# Patient Record
Sex: MALE | Race: BLACK OR AFRICAN AMERICAN | NOT HISPANIC OR LATINO | URBAN - METROPOLITAN AREA
[De-identification: names, ages, dates, MRNs, and addresses within clinical notes are randomized per-mention and may not be internally consistent; named-entity substitution may affect disease eponyms.]

---

## 2017-01-29 ENCOUNTER — EMERGENCY (EMERGENCY)
Facility: HOSPITAL | Age: 22
LOS: 0 days | Discharge: HOME | End: 2017-01-29

## 2017-06-27 DIAGNOSIS — S09.90XA UNSPECIFIED INJURY OF HEAD, INITIAL ENCOUNTER: ICD-10-CM

## 2017-06-27 DIAGNOSIS — M54.2 CERVICALGIA: ICD-10-CM
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
## 2017-06-27 DIAGNOSIS — S40.011A CONTUSION OF RIGHT SHOULDER, INITIAL ENCOUNTER: ICD-10-CM

## 2017-06-27 DIAGNOSIS — S13.4XXA SPRAIN OF LIGAMENTS OF CERVICAL SPINE, INITIAL ENCOUNTER: ICD-10-CM
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
## 2017-10-17 ENCOUNTER — OUTPATIENT (OUTPATIENT)
Dept: OUTPATIENT SERVICES | Facility: HOSPITAL | Age: 22
LOS: 1 days | Discharge: HOME | End: 2017-10-17

## 2017-10-17 DIAGNOSIS — Z01.818 ENCOUNTER FOR OTHER PREPROCEDURAL EXAMINATION: ICD-10-CM

## 2017-10-17 DIAGNOSIS — T84.69XA INFECTION AND INFLAMMATORY REACTION DUE TO INTERNAL FIXATION DEVICE OF OTHER SITE, INITIAL ENCOUNTER: ICD-10-CM

## 2017-10-23 ENCOUNTER — OUTPATIENT (OUTPATIENT)
Dept: OUTPATIENT SERVICES | Facility: HOSPITAL | Age: 22
LOS: 1 days | Discharge: HOME | End: 2017-10-23
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
## 2017-10-27 DIAGNOSIS — T84.89XA OTHER SPECIFIED COMPLICATION OF INTERNAL ORTHOPEDIC PROSTHETIC DEVICES, IMPLANTS AND GRAFTS, INITIAL ENCOUNTER: ICD-10-CM

## 2017-10-27 DIAGNOSIS — Z91.013 ALLERGY TO SEAFOOD: ICD-10-CM

## 2017-10-27 DIAGNOSIS — Z47.2 ENCOUNTER FOR REMOVAL OF INTERNAL FIXATION DEVICE: ICD-10-CM
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
## 2018-05-02 ENCOUNTER — EMERGENCY (EMERGENCY)
Facility: HOSPITAL | Age: 23
LOS: 0 days | Discharge: HOME | End: 2018-05-02
Admitting: EMERGENCY MEDICINE

## 2018-05-02 VITALS
TEMPERATURE: 98 F | SYSTOLIC BLOOD PRESSURE: 141 MMHG | OXYGEN SATURATION: 100 % | RESPIRATION RATE: 20 BRPM | DIASTOLIC BLOOD PRESSURE: 80 MMHG | HEART RATE: 50 BPM

## 2018-05-02 DIAGNOSIS — R52 PAIN, UNSPECIFIED: ICD-10-CM

## 2018-05-02 NOTE — ED ADULT NURSE NOTE - NS ED NURSE DISCH DISPOSITION
Eloped (saw a physician/midlevel provider but left without telling anyone) Left without being seen (saw a nurse but never saw a physician or midlevel provider)

## 2018-07-11 ENCOUNTER — EMERGENCY (EMERGENCY)
Facility: HOSPITAL | Age: 23
LOS: 0 days | Discharge: HOME | End: 2018-07-11
Admitting: PHYSICIAN ASSISTANT

## 2018-07-11 VITALS
HEART RATE: 44 BPM | OXYGEN SATURATION: 100 % | DIASTOLIC BLOOD PRESSURE: 61 MMHG | TEMPERATURE: 98 F | RESPIRATION RATE: 18 BRPM | SYSTOLIC BLOOD PRESSURE: 130 MMHG
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
## 2018-07-11 DIAGNOSIS — M54.6 PAIN IN THORACIC SPINE: ICD-10-CM

## 2018-07-11 DIAGNOSIS — S39.012A STRAIN OF MUSCLE, FASCIA AND TENDON OF LOWER BACK, INITIAL ENCOUNTER: ICD-10-CM
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
## 2018-07-11 NOTE — ED PROVIDER NOTE - OBJECTIVE STATEMENT
22 y.o. male with no significant PMH presents to the ED c/o right upper back pain x 1 day.  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  Pt states he was moving a stretcher yesterday when the pain started.  Pain is worse with movement.  This is the first episode of this kind.  He has not taken anything for pain.  Pt denies any fever, chills, numbness, tingling, urinary/ fecal complaints, chest pain, abd pain or any other complaints.

## 2018-07-11 NOTE — ED PROVIDER NOTE - NS ED ROS FT
Constitutional:  no fevers, no chills, no malaise  Eyes:  No visual changes  ENMT: No neck pain or stiffness, no nasal congestion, no ear pain, no throat pain  Cardiac:  No chest pain  Respiratory:  No cough or sob  GI:  No nausea, vomiting, diarrhea or abdominal pain.  :  No dysuria, frequency or burning.  MS:  + right upper back pain, no joint pain.  Neuro:  No headache, no dizziness, no change in mental status  Skin:  No skin rash  Except as documented in the HPI,  all other systems are negative

## 2018-08-09 ENCOUNTER — EMERGENCY (EMERGENCY)
Facility: HOSPITAL | Age: 23
LOS: 0 days | Discharge: HOME | End: 2018-08-09
Admitting: PHYSICIAN ASSISTANT

## 2018-08-09 VITALS
DIASTOLIC BLOOD PRESSURE: 77 MMHG | HEART RATE: 55 BPM | OXYGEN SATURATION: 100 % | RESPIRATION RATE: 20 BRPM | TEMPERATURE: 99 F | SYSTOLIC BLOOD PRESSURE: 148 MMHG

## 2018-08-09 VITALS — HEIGHT: 74 IN | WEIGHT: 179.9 LBS

## 2018-08-09 DIAGNOSIS — S92.901A UNSPECIFIED FRACTURE OF RIGHT FOOT, INITIAL ENCOUNTER FOR CLOSED FRACTURE: ICD-10-CM
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
## 2018-08-09 DIAGNOSIS — M25.579 PAIN IN UNSPECIFIED ANKLE AND JOINTS OF UNSPECIFIED FOOT: ICD-10-CM

## 2018-08-09 RX ORDER — IBUPROFEN 200 MG
800 TABLET ORAL ONCE
Qty: 0 | Refills: 0 | Status: COMPLETED | OUTPATIENT
Start: 2018-08-09 | End: 2018-08-09

## 2018-08-09 RX ADMIN — Medication 800 MILLIGRAM(S): at 10:33

## 2018-08-09 NOTE — ED ADULT NURSE NOTE - NSIMPLEMENTINTERV_GEN_ALL_ED
Implemented All Universal Safety Interventions:  [place] to call system. Call bell, personal items and telephone within reach. Instruct patient to call for assistance. Room bathroom lighting operational. Non-slip footwear when patient is off stretcher. Physically safe environment: no spills, clutter or unnecessary equipment. Stretcher in lowest position, wheels locked, appropriate side rails in place.

## 2018-08-09 NOTE — ED ADULT NURSE NOTE - OBJECTIVE STATEMENT
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 Denies fall or other injury to site.

## 2018-08-09 NOTE — ED PROVIDER NOTE - MEDICAL DECISION MAKING DETAILS
RICE; OTC ibuprofen as needed as directed; ortho referral; splint acre and crutches given with directions; any new or worsening symptoms, pt will return to ER RICE; OTC ibuprofen as needed as directed; ortho referral; splint care and crutches given with directions; any new or worsening symptoms, pt will return to ER  Note complete

## 2018-08-09 NOTE — ED PROVIDER NOTE - MUSCULOSKELETAL, MLM
Right foot:  no deformity, FROM, (+) tenderness base of fifth metatarsal, no motor or sensory deficit, pedal pulses 2+

## 2018-11-07 ENCOUNTER — EMERGENCY (EMERGENCY)
Facility: HOSPITAL | Age: 23
LOS: 0 days | Discharge: HOME | End: 2018-11-07
Admitting: PHYSICIAN ASSISTANT

## 2018-11-07 VITALS
RESPIRATION RATE: 17 BRPM | HEART RATE: 55 BPM | WEIGHT: 175.05 LBS | DIASTOLIC BLOOD PRESSURE: 80 MMHG | TEMPERATURE: 98 F | SYSTOLIC BLOOD PRESSURE: 129 MMHG | HEIGHT: 74 IN | OXYGEN SATURATION: 100 %

## 2018-11-07 VITALS — HEIGHT: 75 IN | WEIGHT: 179.9 LBS

## 2018-11-07 DIAGNOSIS — R05 COUGH: ICD-10-CM

## 2018-11-07 DIAGNOSIS — J06.9 ACUTE UPPER RESPIRATORY INFECTION, UNSPECIFIED: ICD-10-CM

## 2018-11-07 DIAGNOSIS — Z91.013 ALLERGY TO SEAFOOD: ICD-10-CM

## 2018-11-07 NOTE — ED ADULT NURSE NOTE - OBJECTIVE STATEMENT
pt c/o of uri symptoms x 2 days. pt reports having a productive cough with green sputum. pt denies fever/chills. n/v/d

## 2018-11-07 NOTE — ED ADULT NURSE NOTE - NSIMPLEMENTINTERV_GEN_ALL_ED
Implemented All Universal Safety Interventions:  [place] to call system. Call bell, personal items and telephone within reach. Instruct patient to call for assistance. Room bathroom lighting operational. Non-slip footwear when patient is off stretcher. Physically safe environment: no spills, clutter or unnecessary equipment. Stretcher in lowest position, wheels locked, appropriate side rails in place.

## 2018-11-07 NOTE — ED ADULT NURSE NOTE - CHIEF COMPLAINT QUOTE
I was coming to work this morning and I felt really congested, a slight cough, its really draining me right now  - patient

## 2018-11-07 NOTE — ED PROVIDER NOTE - OBJECTIVE STATEMENT
No PMH. Woke this am with sore throat, cough, nasal congestion. No fever or chills or SOB. Pt works here and is requesting to go home. Pt had flu shot one week ago

## 2018-11-07 NOTE — ED ADULT TRIAGE NOTE - HEIGHT IN CM
Last 5 Patient Entered Readings                                      Current 30 Day Average: 118/74     Recent Readings 4/22/2018 4/22/2018 4/20/2018 4/20/2018 4/20/2018    SBP (mmHg) 124 124 116 116 92    DBP (mmHg) 80 80 77 77 63    Pulse 88 88 91 91 78          Last 6 Patient Entered Readings                                          Most Recent A1c: 6.5% (Goal: 7%)     Recent Readings 4/20/2018 4/18/2018 4/16/2018 4/13/2018 4/10/2018    Blood Glucose (mg/dL) 100 122 105 104 113        Diabetes and Hypertension Digital Medicine Program: Health  Follow Up    Feeling well.  Wondering if she needs to start Aspirin because a friend told her it's a good idea. Will defer to Svitlana Crandall.     Lifestyle Modifications:    1.Low sodium diet: continuing to work on weight loss / switched to wheat bread. Has lost a few pounds.     2.Physical activity: going to the gym regularly with her  / 30 mins on bike and 20 min on treadmill. She would like to start lifting weights too. I encouraged her to start with light weights, 5lbs or less and build up as she's able.    3.Hypotension/Hypertension symptoms: no symptoms with 92/63  Frequency/Alleviating factors/Precipitating factors, etc.     4.Patient has been compliant with the medication regimen.     Follow up with . Vijaya Henriquezmary Montenegro completed. No further questions or concerns. I will follow up in a few weeks to assess progress.     
187.96

## 2018-12-02 ENCOUNTER — EMERGENCY (EMERGENCY)
Facility: HOSPITAL | Age: 23
LOS: 0 days | Discharge: HOME | End: 2018-12-02
Attending: EMERGENCY MEDICINE | Admitting: EMERGENCY MEDICINE

## 2018-12-02 VITALS
HEART RATE: 59 BPM | SYSTOLIC BLOOD PRESSURE: 159 MMHG | TEMPERATURE: 97 F | RESPIRATION RATE: 20 BRPM | DIASTOLIC BLOOD PRESSURE: 95 MMHG | OXYGEN SATURATION: 100 %

## 2018-12-02 VITALS — HEIGHT: 73 IN | WEIGHT: 179.9 LBS

## 2018-12-02 DIAGNOSIS — R11.10 VOMITING, UNSPECIFIED: ICD-10-CM

## 2018-12-02 DIAGNOSIS — R09.81 NASAL CONGESTION: ICD-10-CM

## 2018-12-02 DIAGNOSIS — R05 COUGH: ICD-10-CM

## 2018-12-02 DIAGNOSIS — J02.9 ACUTE PHARYNGITIS, UNSPECIFIED: ICD-10-CM

## 2018-12-02 RX ORDER — SODIUM CHLORIDE 9 MG/ML
3 INJECTION INTRAMUSCULAR; INTRAVENOUS; SUBCUTANEOUS ONCE
Qty: 0 | Refills: 0 | Status: COMPLETED | OUTPATIENT
Start: 2018-12-02 | End: 2018-12-02

## 2018-12-02 RX ORDER — FLUTICASONE PROPIONATE 50 MCG
1 SPRAY, SUSPENSION NASAL
Qty: 1 | Refills: 0 | OUTPATIENT
Start: 2018-12-02 | End: 2018-12-08

## 2018-12-02 RX ADMIN — SODIUM CHLORIDE 3 MILLILITER(S): 9 INJECTION INTRAMUSCULAR; INTRAVENOUS; SUBCUTANEOUS at 12:05

## 2018-12-02 NOTE — ED ADULT NURSE NOTE - OBJECTIVE STATEMENT
abdominal discomfort that started today, states he feels like he cant keep anything down, vomited once today and has nausea. states he has been sick with a cold and sore throat as well for the past 3 or 4 days and feels like he isn't getting better.

## 2018-12-02 NOTE — ED PROVIDER NOTE - OBJECTIVE STATEMENT
22y m no pmh p/w uri sx x 4d. C/o nasal congestion, sore throat & cough. Rpts nausea after coughing, but no vomiting despite triage note. No f/c, ear pain, cp/sob, v/d, abd pain, flank pain, urinary sx, rash. +Sick contact, mother w/similar sx. No recent travel or abx.

## 2018-12-02 NOTE — ED PROVIDER NOTE - PHYSICAL EXAMINATION
VITAL SIGNS: I have reviewed nursing notes and confirm.  CONSTITUTIONAL: Well-developed; well-nourished; in no acute distress.  SKIN: Skin exam is warm and dry, no acute rash.  HEAD: Normocephalic; atraumatic.  EYES: PERRL, EOM intact; conjunctiva and sclera clear.  ENT: TMs clear, +nasal congestion no active rhinorrhea, mild pharyngeal erythema uvula midline no edema no stridor/drooling, phonating well.  NECK: Supple; non tender. No lad.  CARD: S1, S2 normal; no murmurs, gallops, or rubs. Regular rate and rhythm.  RESP: ctab. No wheezes, rales or rhonchi.  ABD: Normal bowel sounds; soft; non-distended; non-tender; no hepatosplenomegaly.  EXT: Normal ROM. No clubbing, cyanosis or edema. DPI.  BACK: non-tender, no CVAT  NEURO: Alert, oriented. Grossly unremarkable. No focal deficits.  PSYCH: Cooperative, appropriate.

## 2018-12-02 NOTE — ED PROVIDER NOTE - NSFOLLOWUPINSTRUCTIONS_ED_ALL_ED_FT
Upper Respiratory Infection, Adult    Most upper respiratory infections (URIs) are a viral infection of the air passages leading to the lungs. A URI affects the nose, throat, and upper air passages. The most common type of URI is nasopharyngitis and is typically referred to as "the common cold."    URIs run their course and usually go away on their own. Most of the time, a URI does not require medical attention, but sometimes a bacterial infection in the upper airways can follow a viral infection. This is called a secondary infection. Sinus and middle ear infections are common types of secondary upper respiratory infections.    Bacterial pneumonia can also complicate a URI. A URI can worsen asthma and chronic obstructive pulmonary disease (COPD). Sometimes, these complications can require emergency medical care and may be life threatening.     CAUSES  Almost all URIs are caused by viruses. A virus is a type of germ and can spread from one person to another.     RISKS FACTORS  You may be at risk for a URI if:     You smoke.    You have chronic heart or lung disease.   You have a weakened defense (immune) system.    You are very young or very old.    You have nasal allergies or asthma.  You work in crowded or poorly ventilated areas.  You work in health care facilities or schools.    SIGNS AND SYMPTOMS  Symptoms typically develop 2–3 days after you come in contact with a cold virus. Most viral URIs last 7–10 days. However, viral URIs from the influenza virus (flu virus) can last 14–18 days and are typically more severe. Symptoms may include:     Runny or stuffy (congested) nose.    Sneezing.    Cough.    Sore throat.    Headache.    Fatigue.    Fever.    Loss of appetite.    Pain in your forehead, behind your eyes, and over your cheekbones (sinus pain).   Muscle aches.      DIAGNOSIS  Your health care provider may diagnose a URI by:    Physical exam.  Tests to check that your symptoms are not due to another condition such as:   Strep throat.  Sinusitis.  Pneumonia.  Asthma.     TREATMENT  A URI goes away on its own with time. It cannot be cured with medicines, but medicines may be prescribed or recommended to relieve symptoms. Medicines may help:    Reduce your fever.   Reduce your cough.   Relieve nasal congestion.     HOME CARE INSTRUCTIONS  Take medicines only as directed by your health care provider.    Gargle warm saltwater or take cough drops to comfort your throat as directed by your health care provider.  Use a warm mist humidifier or inhale steam from a shower to increase air moisture. This may make it easier to breathe.  Drink enough fluid to keep your urine clear or pale yellow.    Eat soups and other clear broths and maintain good nutrition.    Rest as needed.    Return to work when your temperature has returned to normal or as your health care provider advises. You may need to stay home longer to avoid infecting others. You can also use a face mask and careful hand washing to prevent spread of the virus.  Increase the usage of your inhaler if you have asthma.    Do not use any tobacco products, including cigarettes, chewing tobacco, or electronic cigarettes. If you need help quitting, ask your health care provider.    PREVENTION  The best way to protect yourself from getting a cold is to practice good hygiene.     Avoid oral or hand contact with people with cold symptoms.    Wash your hands often if contact occurs.      There is no clear evidence that vitamin C, vitamin E, echinacea, or exercise reduces the chance of developing a cold. However, it is always recommended to get plenty of rest, exercise, and practice good nutrition.     SEEK MEDICAL CARE IF:  You are getting worse rather than better.    Your symptoms are not controlled by medicine.    You have chills.  You have worsening shortness of breath.  You have brown or red mucus.  You have yellow or brown nasal discharge.  You have pain in your face, especially when you bend forward.  You have a fever.  You have swollen neck glands.  You have pain while swallowing.  You have white areas in the back of your throat.     SEEK IMMEDIATE MEDICAL CARE IF:  You have severe or persistent:  Headache.  Ear pain.  Sinus pain.  Chest pain.  You have chronic lung disease and any of the following:  Wheezing.  Prolonged cough.  Coughing up blood.  A change in your usual mucus.  You have a stiff neck.  You have changes in your:  Vision.  Hearing.  Thinking.  Mood.     MAKE SURE YOU:  Understand these instructions.  Will watch your condition.  Will get help right away if you are not doing well or get worse.    ADDITIONAL NOTES AND INSTRUCTIONS    Please follow up with your Primary MD in 24-48 hr.  Seek immediate medical care for any new/worsening signs or symptoms.

## 2018-12-02 NOTE — ED PROVIDER NOTE - NS ED ROS FT
Constitutional: No fever, chills, unintended weight loss.  Eyes:  No visual changes, eye pain or discharge.  ENMT:  see hpi  Cardiac:  No chest pain, SOB or edema. No chest pain with exertion.  Respiratory:  +cough no respiratory distress. No hemoptysis. No history of asthma or RAD.  GI:  +nausea, no vomiting, diarrhea or abdominal pain.  :  No dysuria, frequency or burning.  MS:  No myalgia, muscle weakness, joint pain or back pain.  Neuro:  No headache or weakness.  No LOC.  Skin:  No skin rash.   Endocrine: No history of thyroid disease or diabetes.

## 2019-06-07 ENCOUNTER — EMERGENCY (EMERGENCY)
Facility: HOSPITAL | Age: 24
LOS: 0 days | Discharge: HOME | End: 2019-06-07
Admitting: EMERGENCY MEDICINE
Payer: COMMERCIAL

## 2019-06-07 VITALS
SYSTOLIC BLOOD PRESSURE: 142 MMHG | OXYGEN SATURATION: 100 % | TEMPERATURE: 99 F | RESPIRATION RATE: 20 BRPM | HEART RATE: 68 BPM | HEIGHT: 74 IN | DIASTOLIC BLOOD PRESSURE: 90 MMHG | WEIGHT: 184.97 LBS

## 2019-06-07 DIAGNOSIS — R05 COUGH: ICD-10-CM

## 2019-06-07 DIAGNOSIS — K12.2 CELLULITIS AND ABSCESS OF MOUTH: ICD-10-CM

## 2019-06-07 DIAGNOSIS — Z79.52 LONG TERM (CURRENT) USE OF SYSTEMIC STEROIDS: ICD-10-CM

## 2019-06-07 DIAGNOSIS — Z91.013 ALLERGY TO SEAFOOD: ICD-10-CM

## 2019-06-07 PROCEDURE — 99283 EMERGENCY DEPT VISIT LOW MDM: CPT | Mod: 25

## 2019-06-07 RX ORDER — AMOXICILLIN 250 MG/5ML
1 SUSPENSION, RECONSTITUTED, ORAL (ML) ORAL
Qty: 21 | Refills: 0
Start: 2019-06-07 | End: 2019-06-13

## 2019-06-07 RX ORDER — DEXAMETHASONE 0.5 MG/5ML
10 ELIXIR ORAL ONCE
Refills: 0 | Status: COMPLETED | OUTPATIENT
Start: 2019-06-07 | End: 2019-06-07

## 2019-06-07 RX ORDER — IBUPROFEN 200 MG
600 TABLET ORAL ONCE
Refills: 0 | Status: COMPLETED | OUTPATIENT
Start: 2019-06-07 | End: 2019-06-07

## 2019-06-07 RX ORDER — FLUTICASONE PROPIONATE 50 MCG
1 SPRAY, SUSPENSION NASAL
Qty: 1 | Refills: 0
Start: 2019-06-07 | End: 2019-07-06

## 2019-06-07 RX ADMIN — Medication 10 MILLIGRAM(S): at 08:06

## 2019-06-07 RX ADMIN — Medication 600 MILLIGRAM(S): at 08:05

## 2019-06-07 NOTE — ED PROVIDER NOTE - NS ED ROS FT
Review of Systems:  	•	CONSTITUTIONAL - no fever, no diaphoresis, no chills  	•	SKIN - no rash  	•	EYES - no eye pain, no blurry vision  	•	ENT - no change in hearing, + sore throat, no ear pain or tinnitus  	•	RESPIRATORY - no shortness of breath, + cough  	•	CARDIAC - no chest pain, no palpitations  	•	GI - no abd pain, no nausea, no vomiting, no diarrhea, no constipation  	•	GENITO-URINARY - no discharge, no dysuria; no hematuria, no increased urinary frequency  	•	MUSCULOSKELETAL - no joint paint, no swelling, no redness  	•	NEUROLOGIC - no weakness, no headache, no paresthesias, no LOC

## 2019-06-07 NOTE — ED ADULT NURSE NOTE - NSIMPLEMENTINTERV_GEN_ALL_ED
Implemented All Universal Safety Interventions:  [place] to call system. Call bell, personal items and telephone within reach. Instruct patient to call for assistance. Room bathroom lighting operational. Non-slip footwear when patient is off stretcher. Physically safe environment: no spills, clutter or unnecessary equipment. Stretcher in lowest position, wheels locked, appropriate side rails in place.

## 2019-06-07 NOTE — ED ADULT NURSE NOTE - OBJECTIVE STATEMENT
The patient is a 23y Male complaining of fever, sore throat and cough since yesterday associated with chills. Patient denies any chest pain, shortness of breath, nausea, vomiting, abdominal pain or urinary symptoms.

## 2019-06-07 NOTE — ED PROVIDER NOTE - OBJECTIVE STATEMENT
23 year old with no pmhx presents with sore throat, cough, and fever x 1 day. Pt admits to yellow phelgm. no SOB, CP, N/V/D, abdominal pain, or ear pain. 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

## 2019-11-27 NOTE — ED PROVIDER NOTE - NS_EDPROVIDERDISPOUSERTYPE_ED_A_ED
1. Caller Name: Lachelle                                         Call Back Number: [phone] (home)         Patient approves a detailed voicemail message: no    Patient prolia shipment came into. Schedule patient for Wed Dec 5th. Will you please place an inhouse injection for patient?    
I have personally evaluated and examined the patient. The Attending was available to me as a supervising provider if needed.

## 2020-04-26 ENCOUNTER — MESSAGE (OUTPATIENT)
Age: 25
End: 2020-04-26

## 2020-05-03 ENCOUNTER — APPOINTMENT (OUTPATIENT)
Dept: DISASTER EMERGENCY | Facility: HOSPITAL | Age: 25
End: 2020-05-03

## 2020-05-04 LAB
SARS-COV-2 IGG SERPL IA-ACNC: <0.1 INDEX
SARS-COV-2 IGG SERPL QL IA: NEGATIVE

## 2020-08-17 ENCOUNTER — EMERGENCY (EMERGENCY)
Facility: HOSPITAL | Age: 25
LOS: 0 days | Discharge: HOME | End: 2020-08-17
Attending: STUDENT IN AN ORGANIZED HEALTH CARE EDUCATION/TRAINING PROGRAM | Admitting: STUDENT IN AN ORGANIZED HEALTH CARE EDUCATION/TRAINING PROGRAM
Payer: OTHER MISCELLANEOUS

## 2020-08-17 VITALS
TEMPERATURE: 97 F | SYSTOLIC BLOOD PRESSURE: 130 MMHG | RESPIRATION RATE: 20 BRPM | DIASTOLIC BLOOD PRESSURE: 80 MMHG | HEART RATE: 88 BPM | OXYGEN SATURATION: 100 %

## 2020-08-17 DIAGNOSIS — Z91.013 ALLERGY TO SEAFOOD: ICD-10-CM

## 2020-08-17 DIAGNOSIS — S60.511A ABRASION OF RIGHT HAND, INITIAL ENCOUNTER: ICD-10-CM
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
## 2020-08-17 DIAGNOSIS — M79.643 PAIN IN UNSPECIFIED HAND: ICD-10-CM
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
## 2020-08-17 PROCEDURE — 99053 MED SERV 10PM-8AM 24 HR FAC: CPT

## 2020-08-17 PROCEDURE — 99282 EMERGENCY DEPT VISIT SF MDM: CPT

## 2020-08-17 NOTE — ED ADULT TRIAGE NOTE - CHIEF COMPLAINT QUOTE
As per patient, was on a 1:1 sit upstairs when a patient scratched his right hand. Patient cleaned hand with soap and water.

## 2020-08-17 NOTE — ED PROVIDER NOTE - OBJECTIVE STATEMENT
25 y/o M p/w scratch to right hand by Patient, states little bleeding after, transient, no other complaints. otherwise feeling well.

## 2020-08-17 NOTE — ED PROVIDER NOTE - PHYSICAL EXAMINATION
VITAL SIGNS: I have reviewed nursing notes and confirm.  CONSTITUTIONAL: well-appearing, non-toxic, NAD  SKIN: Warm dry, normal skin turgor, right hand dorsal skin linear erythema   NECK: Supple; non tender. Full ROM. No cervical LAD  PSYCH: Cooperative, appropriate.

## 2020-08-17 NOTE — ED PROVIDER NOTE - CLINICAL SUMMARY MEDICAL DECISION MAKING FREE TEXT BOX
I personally evaluated the patient. I reviewed the Resident’s or Physician Assistant’s note (as assigned above), and agree with the findings and plan except as documented in my note. Patient evaluated for R hand abrasion. Abrasion cleaned in ED. I have fully discussed the medical management and delivery of care with the patient. I have discussed any available labs, imaging and treatment options with the patient. Patient confirms understanding and has been given detailed return precautions. Patient instructed to return to the ED should symptoms persist or worsen. Patient has demonstrated capacity and has verbalized understanding. Patient is well appearing upon discharge.

## 2020-08-17 NOTE — ED PROVIDER NOTE - NSFOLLOWUPINSTRUCTIONS_ED_ALL_ED_FT
Abrasion  ImageAn abrasion is a cut or a scrape on the outer surface of the skin. An abrasion does not go through all the layers of the skin. It is important to care for your abrasion properly to prevent infection.    What are the causes?  This condition is caused by falling on or gliding across the ground or another surface. When your skin rubs on something, the outer and inner layers of skin may rub off.    What are the signs or symptoms?  The main symptom of this condition is a cut or a scrape. The scrape may be bleeding, or it may appear red or pink. If the abrasion was caused by a fall, there may be a bruise under the cut or scrape.    How is this diagnosed?  An abrasion is diagnosed with a physical exam.    How is this treated?  Treatment for this condition depends on how large and deep the abrasion is. In most cases:    Your abrasion will be cleaned with water and mild soap. This is done to remove any dirt or debris (such as particles of glass or rock) that may be stuck in the wound.  An antibiotic ointment may be applied to the abrasion to help prevent infection.  A bandage (dressing) may be placed on the abrasion to keep it clean.    You may also need a tetanus shot.    Follow these instructions at home:  Medicines     Take or apply over-the-counter and prescription medicines only as told by your health care provider.  If you were prescribed an antibiotic medicine, apply it as told by your health care provider.  Wound care     Clean the wound 2–3 times a day, or as directed by your health care provider. To do this, wash the wound with mild soap and water, rinse off the soap, and pat the wound dry with a clean towel. Do not rub the wound.  Keep the dressing clean and dry as told by your health care provider.  There are many different ways to close and cover a wound. Follow instructions from your health care provider about:    Caring for your wound.  Changing and removing your dressing. You may have to change your dressing one or more times a day, or as directed by your health care provider.    Check your wound every day for signs of infection. Check for:    Redness, particularly a red streak that spreads out from the wound.  Swelling or increased pain.  Warmth.  Fluid, pus, or a bad smell.    If directed, put ice on the injured area to reduce pain and swelling:    Put ice in a plastic bag.   Place a towel between your skin and the bag.   Leave the ice on for 20 minutes, 2–3 times a day.    General instructions     Do not take baths, swim, or use a hot tub until your health care provider says it is okay to do so.  If possible, raise (elevate) the injured area above the level of your heart while you are sitting or lying down. This will reduce pain and swelling.  Keep all follow-up visits as directed by your health care provider. This is important.  Contact a health care provider if:  You received a tetanus shot, and you have swelling, severe pain, redness, or bleeding at the injection site.  Your pain is not controlled with medicine.  You have redness, swelling, or more pain at the site of your wound.  Get help right away if:  You have a red streak spreading away from your wound.  You have a fever.  You have fluid, blood, or pus coming from your wound.  You notice a bad smell coming from your wound or your dressing.  Summary  An abrasion is a cut or a scrape on the outer surface of the skin. An abrasion does not go through all the layers of the skin.  Care for your abrasion properly to prevent infection.  Clean the wound with mild soap and water 2–3 times a day. Follow instructions from your health care provider about taking medicines and changing your bandage (dressing).  Contact your health care provider if you have redness, swelling or more pain in the wound area.  Get help right away if you have a fever or if you have fluid, blood, pus, a bad smell, or a red streak coming from the wound.  This information is not intended to replace advice given to you by your health care provider. Make sure you discuss any questions you have with your health care provider.

## 2020-08-17 NOTE — ED PROVIDER NOTE - ATTENDING CONTRIBUTION TO CARE
23 yo M no sig pmh pw abrasion. Scratched on his R hand. Bleeding controlled. No decrease rom, no active bleeding, no tendon/bone exposure. Tetanus utd.     CONSTITUTIONAL: Well-developed; well-nourished; in no acute distress. Sitting up and providing appropriate history and physical examination  SKIN: +abrasion R hand. No active bleeding, no tendon/bone exposed, otherwise skin exam is warm and dry, no acute rash.  HEAD: Normocephalic; atraumatic.  EYES: PERRL, 3 mm bilateral, no nystagmus, EOM intact; conjunctiva and sclera clear.  EXT: Normal ROM. No clubbing, cyanosis or edema. Dp and Pt Pulses intact. Cap refill less than 3 seconds  NEURO: CN 2-12 intact, normal finger to nose, normal romberg, stable gait, no sensory or motor deficits, Alert, oriented, grossly unremarkable. No Focal deficits. GCS 15. NIH 0  PSYCH: Cooperative, appropriate.

## 2020-08-17 NOTE — ED PROVIDER NOTE - PATIENT PORTAL LINK FT
You can access the FollowMyHealth Patient Portal offered by [facility] by registering at the following website: [email or web address]. By joining [employer]’s FollowMyHealth portal, you will also be able to view your health information using other applications (apps) compatible with our system.

## 2020-08-17 NOTE — ED PROVIDER NOTE - NS ED ROS FT
Constitutional: See HPI.  Skin: see hpi   Except as documented in the HPI, all other systems are negative.

## 2022-08-08 NOTE — ED ADULT TRIAGE NOTE - HEART RATE (BEATS/MIN)
Continued Stay Note  [facility]     Patient Name: Piper Cain  MRN: [id number]  Today's Date: 8/8/2022    Admit Date: 8/6/2022     Discharge Plan     Row Name 08/08/22 1526       Plan    Plan Home no needs    Plan Comments Met with patient who stated DC plan is home. Denies any needs/equipment at this time. CCP will continue to follow for poss needs.               Discharge Codes    No documentation.                     Cynthia Bonilla RN     88

## 2022-11-23 NOTE — ED ADULT TRIAGE NOTE - CHIEF COMPLAINT QUOTE
I was coming to work this morning and I felt really congested, a slight cough, its really draining me right now  - patient Tranexamic Acid Counseling:  Patient advised of the small risk of bleeding problems with tranexamic acid. They were also instructed to call if they developed any nausea, vomiting or diarrhea. All of the patient's questions and concerns were addressed.

## 2023-05-30 NOTE — ED PROVIDER NOTE - CLINICAL SUMMARY MEDICAL DECISION MAKING FREE TEXT BOX
+ fever, lymphadenopathy, and petiache to back of pharynx will treat for strep.. will follow up with PMD (+) eye tracking in all fields except bilateral peripheral/inferior fields delayed; + acuity intact in central/peripheral fields; impaired finger to thumb coordination in LUE.

## 2024-01-16 NOTE — ED ADULT NURSE NOTE - HEIGHT IN CM
A1C at Goal  Continue Metformin 500 mg BID  6 month f/u with labs via virtual   Follow ADA diet.  Avoid soda, simple sweets, and limit rice/pasta/bread/starches and consume brown options when possible.   Maintain healthy weight with BMI goal <30.   Perform aerobic exercise for 150 minutes per week (or 5 days a week for 30 minutes each day).   Examine feet daily.     Lab Results   Component Value Date    HGBA1C 6.2 12/21/2023       
Lab Results   Component Value Date    [id number] 24.8 (L) 12/21/2023     Educated on increasing foods high in Vitamin D such as fish oil, cod liver oil, salmon, milk fortified with vitamin D.  Vitamin D 2000 I.U. tablets daily (purchase over the counter).  Repeat Vitamin D level as ordered.    
190.5